# Patient Record
Sex: MALE | Race: WHITE | Employment: FULL TIME | URBAN - METROPOLITAN AREA
[De-identification: names, ages, dates, MRNs, and addresses within clinical notes are randomized per-mention and may not be internally consistent; named-entity substitution may affect disease eponyms.]

---

## 2018-12-24 ENCOUNTER — HOSPITAL ENCOUNTER (OUTPATIENT)
Dept: CT IMAGING | Age: 43
Discharge: HOME | End: 2018-12-24
Attending: NURSE PRACTITIONER
Payer: COMMERCIAL

## 2018-12-24 DIAGNOSIS — N28.89 OTHER SPECIFIED DISORDERS OF KIDNEY AND URETER: ICD-10-CM

## 2018-12-24 PROCEDURE — 74011636320 HC RX REV CODE- 636/320

## 2018-12-24 PROCEDURE — 74178 CT ABD&PLV WO CNTR FLWD CNTR: CPT

## 2018-12-24 RX ADMIN — IOPAMIDOL 100 ML: 612 INJECTION, SOLUTION INTRAVENOUS at 08:32

## 2021-09-03 ENCOUNTER — TRANSCRIBE ORDER (OUTPATIENT)
Dept: SCHEDULING | Age: 46
End: 2021-09-03

## 2021-09-03 DIAGNOSIS — R20.2 TINGLING SENSATION: ICD-10-CM

## 2021-09-03 DIAGNOSIS — H54.7 VISION LOSS: ICD-10-CM

## 2021-09-03 DIAGNOSIS — R42 LIGHT-HEADED: Primary | ICD-10-CM

## 2021-09-09 ENCOUNTER — HOSPITAL ENCOUNTER (OUTPATIENT)
Dept: CT IMAGING | Age: 46
Discharge: HOME | End: 2021-09-09
Attending: FAMILY MEDICINE
Payer: COMMERCIAL

## 2021-09-09 DIAGNOSIS — R42 LIGHT-HEADED: ICD-10-CM

## 2021-09-09 DIAGNOSIS — R20.2 TINGLING SENSATION: ICD-10-CM

## 2021-09-09 DIAGNOSIS — H54.7 VISION LOSS: ICD-10-CM

## 2021-09-09 PROCEDURE — 70450 CT HEAD/BRAIN W/O DYE: CPT

## 2022-07-01 ENCOUNTER — OFFICE VISIT (OUTPATIENT)
Dept: CARDIOLOGY CLINIC | Age: 47
End: 2022-07-01
Payer: COMMERCIAL

## 2022-07-01 VITALS
WEIGHT: 240 LBS | HEIGHT: 76 IN | BODY MASS INDEX: 29.22 KG/M2 | DIASTOLIC BLOOD PRESSURE: 60 MMHG | SYSTOLIC BLOOD PRESSURE: 110 MMHG | OXYGEN SATURATION: 97 % | HEART RATE: 54 BPM | RESPIRATION RATE: 16 BRPM

## 2022-07-01 DIAGNOSIS — R07.89 OTHER CHEST PAIN: ICD-10-CM

## 2022-07-01 DIAGNOSIS — I10 BENIGN ESSENTIAL HTN: ICD-10-CM

## 2022-07-01 DIAGNOSIS — Z98.84 HISTORY OF WEIGHT LOSS SURGERY: ICD-10-CM

## 2022-07-01 DIAGNOSIS — I20.0 UNSTABLE ANGINA (HCC): Primary | ICD-10-CM

## 2022-07-01 DIAGNOSIS — R00.1 BRADYCARDIA: ICD-10-CM

## 2022-07-01 DIAGNOSIS — Z82.49 FAMILY HISTORY OF EARLY CAD: ICD-10-CM

## 2022-07-01 PROCEDURE — 99204 OFFICE O/P NEW MOD 45 MIN: CPT | Performed by: INTERNAL MEDICINE

## 2022-07-01 PROCEDURE — 93000 ELECTROCARDIOGRAM COMPLETE: CPT | Performed by: INTERNAL MEDICINE

## 2022-07-01 RX ORDER — HYDROXYZINE 25 MG/1
TABLET, FILM COATED ORAL
COMMUNITY
Start: 2022-04-22

## 2022-07-01 RX ORDER — SERTRALINE HYDROCHLORIDE 50 MG/1
TABLET, FILM COATED ORAL
COMMUNITY
Start: 2022-04-22

## 2022-07-01 RX ORDER — METOPROLOL SUCCINATE 25 MG/1
25 TABLET, EXTENDED RELEASE ORAL DAILY
COMMUNITY
Start: 2022-05-31

## 2022-07-01 RX ORDER — SILDENAFIL 50 MG/1
TABLET, FILM COATED ORAL
COMMUNITY

## 2022-07-01 RX ORDER — OMEPRAZOLE 40 MG/1
CAPSULE, DELAYED RELEASE ORAL
COMMUNITY
Start: 2022-05-06

## 2022-07-01 NOTE — PROGRESS NOTES
[facility] [place]: Casey Heath  [phone]    History of Present Illness:  Mr. Amanda Ohara is a 54 yo M with a family history of coronary artery disease, history of Lap Band surgery approximately ten years ago that was successful, essential hypertension, self-referred for cardiac evaluation. He also is followed by Dr. Zaheer MORAN for reflux. He is here due to various symptoms that occurred recently. He has had episodes where he will have significant nausea, diaphoresis and chest pain and \"cramping\" sensation sometimes with also discomfort and soreness in his left arm. Sometimes this is associated with shortness of breath. He has gone to urgent care and to the ER at [facility] for these symptoms. Workup when they checked him there has overall been unrevealing. He notes that when this does occur sometimes it happens in the evening and his blood pressure will be elevated. In general when this happens, they last for a few minutes at a time. He used to go to the gym more regularly, but has not been going regularly for the last two months. He is compensated on exam with clear lungs and no lower extremity edema. Soc hx. No tobacco.  Fam hx. Dad with CAD/CABG. Assessment and Plan:   1. Chest pain and shortness of breath. Symptoms with typical and atypical features; will proceed with a treadmill stress echocardiogram for further evaluation. His EKG here was sinus bradycardia, nonspecific ST-T wave abnormality. If this is unrevealing, will consider GI etiology given his history of Lap Band surgery. He is followed by Dr. Zaheer MORAN. 2. Family history of early coronary artery disease. 3. Essential hypertension. Blood pressure is controlled. 4. Weight loss surgery, status post Lap Band. He  has a past medical history of LAP-BAND surgery status. All other systems negative except as above.      PE  Vitals:    07/01/22 1359   BP: 110/60   Pulse: (!) 54   Resp: 16   SpO2: 97%   Weight: 240 lb (108.9 kg) Height: 6' 4\" (1.93 m)    Body mass index is 29.21 kg/m². General appearance - alert, well appearing, and in no distress  Mental status - affect appropriate to mood  Eyes - sclera anicteric, moist mucous membranes  Neck - supple, no JVD  Chest - clear to auscultation, no wheezes, rales or rhonchi  Heart - normal rate, regular rhythm, normal S1, S2, no murmurs, rubs, clicks or gallops  Abdomen - soft, nontender, nondistended, no masses or organomegaly  Neurological - no focal deficit  Extremities - peripheral pulses normal, no pedal edema      Recent Labs:  No results found for: CHOL, CHOLX, CHLST, CHOLV, 933641, HDL, HDLP, LDL, LDLC, DLDLP, TGLX, TRIGL, TRIGP, CHHD, [facility]  Lab Results   Component Value Date/Time    Creatinine 1.06 07/16/2016 03:22 AM     Lab Results   Component Value Date/Time    BUN 11 07/16/2016 03:22 AM     Lab Results   Component Value Date/Time    Potassium 3.8 07/16/2016 03:22 AM     No results found for: HBA1C, DNN1JUTD, CMK5ZZLP  Lab Results   Component Value Date/Time    HGB 15.2 07/16/2016 03:22 AM     Lab Results   Component Value Date/Time    PLATELET 079 25/15/8165 03:22 AM       Reviewed:  Past Medical History:   Diagnosis Date    LAP-BAND surgery status     s/p 1 year     Social History     Tobacco Use   Smoking Status Current Some Day Smoker   Smokeless Tobacco Never Used     Social History     Substance and Sexual Activity   Alcohol Use No    Alcohol/week: 1.7 standard drinks    Types: 2 Shots of liquor per week    Comment: monthly      No Known Allergies    Current Outpatient Medications   Medication Sig    omeprazole (PRILOSEC) 40 mg capsule TAKE 1 CAPSULE BY MOUTH BEFORE LUNCH ON EMPTY STOMACH    hydrOXYzine HCL (ATARAX) 25 mg tablet TAKE 1/2 TO 1 TABLET IN THE EVENING ORALLY ONCE A DAY 90 DAYS    sertraline (ZOLOFT) 50 mg tablet TAKE 1 TABLET BY MOUTH EVERY DAY FOR 90 DAYS    metoprolol succinate (TOPROL-XL) 25 mg XL tablet Take 25 mg by mouth daily.     sildenafil citrate (Viagra) 50 mg tablet Take  by mouth daily as needed for Erectile Dysfunction.  HYDROcodone-acetaminophen (NORCO) 5-325 mg per tablet Take 1 Tab by mouth every four (4) hours as needed for Pain. No current facility-administered medications for this visit.        Chel Abdi MD  [facility] [place]  [address] [facility] [address]  [address]

## 2022-07-01 NOTE — LETTER
Patient:  Leydi Hutchinson III   YOB: 1975  Date of Visit: 7/1/2022      Dear Seven Elaine MD  [address]  [address]  [address] [place] [id number]  Via Fax: [phone]: Thank you for referring Mr. Kota Sherman to me for evaluation/treatment. Below are the relevant portions of my assessment and plan of care. Mr. Kiran Henson is a 54 yo M with a family history of coronary artery disease, history of Lap Band surgery approximately ten years ago that was successful, essential hypertension, self-referred for cardiac evaluation. He also is followed by [facility], Dr. Patrecia Cowden for reflux. He is here due to various symptoms that occurred recently. He has had episodes where he will have significant nausea, diaphoresis and chest pain and \"cramping\" sensation sometimes with also discomfort and soreness in his left arm. Sometimes this is associated with shortness of breath. He has gone to urgent care and to the ER at [facility] for these symptoms. Workup when they checked him there has overall been unrevealing. He notes that when this does occur sometimes it happens in the evening and his blood pressure will be elevated. In general when this happens, they last for a few minutes at a time. He used to go to the gym more regularly, but has not been going regularly for the last two months. He is compensated on exam with clear lungs and no lower extremity edema. Soc hx. No tobacco.  Fam hx. Dad with CAD/CABG. Assessment and Plan:   1. Chest pain and shortness of breath. Symptoms with typical and atypical features; will proceed with a treadmill stress echocardiogram for further evaluation. His EKG here was sinus bradycardia, nonspecific ST-T wave abnormality. If this is unrevealing, will consider GI etiology given his history of Lap Band surgery. He is followed by GI, Dr. Patrecia Cowden. 2. Family history of early coronary artery disease. 3. Essential hypertension. Blood pressure is controlled.     4. Weight loss surgery, status post Lap Band. If you have questions, please do not hesitate to call me.      Sincerely,      Francia Borden MD

## 2022-07-13 ENCOUNTER — HOSPITAL ENCOUNTER (OUTPATIENT)
Dept: NON INVASIVE DIAGNOSTICS | Age: 47
Discharge: HOME | End: 2022-07-13
Attending: INTERNAL MEDICINE
Payer: COMMERCIAL

## 2022-07-13 VITALS
DIASTOLIC BLOOD PRESSURE: 82 MMHG | WEIGHT: 240 LBS | HEIGHT: 76 IN | BODY MASS INDEX: 29.22 KG/M2 | SYSTOLIC BLOOD PRESSURE: 130 MMHG

## 2022-07-13 DIAGNOSIS — I20.0 UNSTABLE ANGINA (HCC): ICD-10-CM

## 2022-07-13 DIAGNOSIS — R07.89 OTHER CHEST PAIN: ICD-10-CM

## 2022-07-13 DIAGNOSIS — R00.1 BRADYCARDIA: ICD-10-CM

## 2022-07-13 DIAGNOSIS — Z98.84 HISTORY OF WEIGHT LOSS SURGERY: ICD-10-CM

## 2022-07-13 DIAGNOSIS — Z82.49 FAMILY HISTORY OF EARLY CAD: ICD-10-CM

## 2022-07-13 DIAGNOSIS — I10 BENIGN ESSENTIAL HTN: ICD-10-CM

## 2022-07-13 LAB
ECHO LV EDV A2C: 103 ML
ECHO LV EDV A4C: 118 ML
ECHO LV EDV BP: 113 ML (ref 67–155)
ECHO LV EDV INDEX A4C: 49 ML/M2
ECHO LV EDV INDEX BP: 47 ML/M2
ECHO LV EDV NDEX A2C: 43 ML/M2
ECHO LV EJECTION FRACTION A2C: 48 %
ECHO LV EJECTION FRACTION A4C: 46 %
ECHO LV EJECTION FRACTION BIPLANE: 48 % (ref 55–100)
ECHO LV ESV A2C: 53 ML
ECHO LV ESV A4C: 64 ML
ECHO LV ESV BP: 59 ML (ref 22–58)
ECHO LV ESV INDEX A2C: 22 ML/M2
ECHO LV ESV INDEX A4C: 27 ML/M2
ECHO LV ESV INDEX BP: 25 ML/M2
ECHO TV REGURGITANT MAX VELOCITY: 2.34 M/S
ECHO TV REGURGITANT PEAK GRADIENT: 22 MMHG
STRESS ANGINA INDEX: 0
STRESS BASELINE DIAS BP: 82 MMHG
STRESS BASELINE HR: 60 BPM
STRESS BASELINE ST DEPRESSION: 0 MM
STRESS BASELINE SYS BP: 130 MMHG
STRESS ESTIMATED WORKLOAD: 12.1 METS
STRESS EXERCISE DUR MIN: 9 MIN
STRESS EXERCISE DUR SEC: 34 SEC
STRESS PEAK DIAS BP: 80 MMHG
STRESS PEAK SYS BP: 160 MMHG
STRESS PERCENT HR ACHIEVED: 93 %
STRESS POST PEAK HR: 162 BPM
STRESS RATE PRESSURE PRODUCT: ABNORMAL BPM*MMHG
STRESS TARGET HR: 174 BPM

## 2022-07-13 PROCEDURE — 93351 STRESS TTE COMPLETE: CPT

## 2022-07-14 ENCOUNTER — TELEPHONE (OUTPATIENT)
Dept: CARDIOLOGY CLINIC | Age: 47
End: 2022-07-14

## 2022-07-14 NOTE — TELEPHONE ENCOUNTER
Rebeca Kidney, MD Dao Villa, RN  Please let pt know stress test was normal. thx      Two patient identifiers verified. Per MD patient called and given results. . Patient verbalized understanding and denies any further questions or concerns at this time. No future appointments.

## 2023-05-20 RX ORDER — OMEPRAZOLE 40 MG/1
CAPSULE, DELAYED RELEASE ORAL
COMMUNITY
Start: 2022-05-06

## 2023-05-20 RX ORDER — HYDROXYZINE HYDROCHLORIDE 25 MG/1
TABLET, FILM COATED ORAL
COMMUNITY
Start: 2022-04-22

## 2023-05-20 RX ORDER — METOPROLOL SUCCINATE 25 MG/1
25 TABLET, EXTENDED RELEASE ORAL DAILY
COMMUNITY
Start: 2022-05-31

## 2023-05-20 RX ORDER — HYDROCODONE BITARTRATE AND ACETAMINOPHEN 5; 325 MG/1; MG/1
1 TABLET ORAL EVERY 4 HOURS PRN
COMMUNITY
Start: 2012-12-22

## 2023-05-20 RX ORDER — SILDENAFIL 50 MG/1
TABLET, FILM COATED ORAL DAILY PRN
COMMUNITY